# Patient Record
Sex: FEMALE | Race: WHITE | NOT HISPANIC OR LATINO | URBAN - NONMETROPOLITAN AREA
[De-identification: names, ages, dates, MRNs, and addresses within clinical notes are randomized per-mention and may not be internally consistent; named-entity substitution may affect disease eponyms.]

---

## 2019-08-13 ENCOUNTER — APPOINTMENT (RX ONLY)
Dept: URBAN - NONMETROPOLITAN AREA CLINIC 13 | Facility: CLINIC | Age: 64
Setting detail: DERMATOLOGY
End: 2019-08-13

## 2019-08-13 VITALS — WEIGHT: 150 LBS | HEIGHT: 66 IN

## 2019-08-13 DIAGNOSIS — L23.0 ALLERGIC CONTACT DERMATITIS DUE TO METALS: ICD-10-CM

## 2019-08-13 PROBLEM — H91.90 UNSPECIFIED HEARING LOSS, UNSPECIFIED EAR: Status: ACTIVE | Noted: 2019-08-13

## 2019-08-13 PROCEDURE — 95044 PATCH/APPLICATION TESTS: CPT

## 2019-08-13 PROCEDURE — ? PATCH TESTING

## 2019-08-13 ASSESSMENT — LOCATION SIMPLE DESCRIPTION DERM: LOCATION SIMPLE: LEFT UPPER BACK

## 2019-08-13 ASSESSMENT — LOCATION ZONE DERM: LOCATION ZONE: TRUNK

## 2019-08-13 ASSESSMENT — LOCATION DETAILED DESCRIPTION DERM: LOCATION DETAILED: LEFT SUPERIOR MEDIAL UPPER BACK

## 2019-08-13 NOTE — PROCEDURE: PATCH TESTING
Detail Level: None
Consent: Written consent obtained, risks reviewed including but not limited to rash, itching, allergic reaction, systemic rash, remote possiblity of anaphylaxis to allergen.
Post-Care Instructions: I reviewed with the patient in detail post-care instructions. Patient should not sweat, pick at, or get the patches wet for 48 hours.
Number Of Patches (Maximum Allowable Per Dos By Cms Is 90): 7

## 2019-08-13 NOTE — PROCEDURE: MIPS QUALITY
Quality 402: Tobacco Use And Help With Quitting Among Adolescents: Patient screened for tobacco and never smoked
Quality 110: Preventive Care And Screening: Influenza Immunization: Influenza Immunization Administered during Influenza season
Quality 111:Pneumonia Vaccination Status For Older Adults: Pneumococcal Vaccination not Administered or Previously Received, Reason not Otherwise Specified
Detail Level: Detailed
Quality 226: Preventive Care And Screening: Tobacco Use: Screening And Cessation Intervention: Patient screened for tobacco use and is an ex/non-smoker

## 2019-08-15 ENCOUNTER — APPOINTMENT (RX ONLY)
Dept: URBAN - NONMETROPOLITAN AREA CLINIC 13 | Facility: CLINIC | Age: 64
Setting detail: DERMATOLOGY
End: 2019-08-15

## 2019-08-15 DIAGNOSIS — L23.0 ALLERGIC CONTACT DERMATITIS DUE TO METALS: ICD-10-CM

## 2019-08-15 PROCEDURE — ? METALS PATCH TEST READING

## 2019-08-15 PROCEDURE — 99212 OFFICE O/P EST SF 10 MIN: CPT

## 2019-08-15 NOTE — PROCEDURE: MIPS QUALITY
Quality 111:Pneumonia Vaccination Status For Older Adults: Pneumococcal Vaccination not Administered or Previously Received, Reason not Otherwise Specified
Quality 110: Preventive Care And Screening: Influenza Immunization: Influenza Immunization Administered during Influenza season
Quality 402: Tobacco Use And Help With Quitting Among Adolescents: Patient screened for tobacco and never smoked
Detail Level: Detailed
Quality 226: Preventive Care And Screening: Tobacco Use: Screening And Cessation Intervention: Patient screened for tobacco use and is an ex/non-smoker

## 2019-08-15 NOTE — PROCEDURE: METALS PATCH TEST READING
Allergen 15 Reaction: no reaction
Name Of Allergen 4: 17 - Titanium Dioxide
Detail Level: Zone
Name Of Allergen 7: 20 - Calcium titanate
Name Of Allergen 2: 43 - sodium tetrachloropalladate (II) hydrate
Name Of Allergen 3: 16 - Titanium (III) Nitrate
Name Of Allergen 5: 19 - Titanium (III) oxalate decahydrate
Number Of Patches Read: 7
Name Of Allergen 6: 21 - Titanium
Show Negative Results In The Note?: Yes
Show Allergen Counseling In The Note?: No
Name Of Allergen 1: 18 - Zinc Chloride

## 2019-08-16 ENCOUNTER — APPOINTMENT (RX ONLY)
Dept: URBAN - METROPOLITAN AREA CLINIC 1 | Facility: CLINIC | Age: 64
Setting detail: DERMATOLOGY
End: 2019-08-16

## 2019-08-16 DIAGNOSIS — L23.0 ALLERGIC CONTACT DERMATITIS DUE TO METALS: ICD-10-CM

## 2019-08-16 PROCEDURE — 99212 OFFICE O/P EST SF 10 MIN: CPT

## 2019-08-16 PROCEDURE — ? METALS PATCH TEST READING

## 2019-08-16 NOTE — PROCEDURE: METALS PATCH TEST READING
Name Of Allergen 7: 20 - Calcium titanate
Name Of Allergen 2: 43 - sodium tetrachloropalladate (II) hydrate
Allergen 68 Reaction: no reaction
Show Allergen Counseling In The Note?: No
Detail Level: Zone
Name Of Allergen 1: 18 - Zinc Chloride
Number Of Patches Read: 7
Name Of Allergen 6: 21 - Titanium
Name Of Allergen 5: 19 - Titanium (III) oxalate decahydrate
Name Of Allergen 4: 17 - Titanium Dioxide
Name Of Allergen 3: 16 - Titanium (III) Nitrate
Show Negative Results In The Note?: Yes

## 2019-08-16 NOTE — PROCEDURE: MIPS QUALITY
Detail Level: Detailed
Quality 111:Pneumonia Vaccination Status For Older Adults: Pneumococcal Vaccination not Administered or Previously Received, Reason not Otherwise Specified
Quality 226: Preventive Care And Screening: Tobacco Use: Screening And Cessation Intervention: Patient screened for tobacco use and is an ex/non-smoker
Quality 402: Tobacco Use And Help With Quitting Among Adolescents: Patient screened for tobacco and never smoked
Quality 110: Preventive Care And Screening: Influenza Immunization: Influenza Immunization Administered during Influenza season